# Patient Record
(demographics unavailable — no encounter records)

---

## 2025-07-17 NOTE — IMAGING
[de-identified] :  LEFT KNEE Inspection:  minimal effusion Palpation: medial facet of the patella tenderness  Knee Range of Motion:  0-135 Strength: 5/5 Quadriceps strength, 5/5 Hamstring strength, 4/5 Hip Abductor strength Neurological: light touch is intact throughout Ligament Stability and Special Tests:  McMurrays: neg Lachman: neg Pivot Shift: neg Posterior Drawer: neg Valgus: neg Varus: neg Patella Apprehension: neg Patella Maltracking: neg   RIGHT KNEE Inspection:  minimal effusion Palpation: medial facet of the patella tenderness  Knee Range of Motion:  0-135 Strength: 5/5 Quadriceps strength, 5/5 Hamstring strength, 4/5 Hip Abductor strength Neurological: light touch is intact throughout Ligament Stability and Special Tests:  McMurrays: neg Lachman: neg Pivot Shift: neg Posterior Drawer: neg Valgus: neg Varus: neg Patella Apprehension: neg Patella Maltracking: neg

## 2025-07-17 NOTE — HISTORY OF PRESENT ILLNESS
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 Has tried ice, Tylenol, ibuprofen

## 2025-07-17 NOTE — HISTORY OF PRESENT ILLNESS
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 Has tried ice, Tylenol, ibuprofen

## 2025-07-17 NOTE — ASSESSMENT
[FreeTextEntry1] : Bilateral X-Ray Examination of the KNEE (4 views): there are no fractures, subluxations or dislocations.   - We discussed their diagnosis and treatment options at length including the risks and benefits of both surgical and non-surgical options. Surgical risks include but are not limited to pain, infection, bleeding, vascular injury, numbness, tingling, nerve damage. - Due to the risk of surgery, they will continue conservative treatment with icing, anti-inflammatory medication, and PT - The patient was provided with a prescription to work on hip ER/abductors strengthening along with quad/hamstring stretches and strengthening - The patient was advised to let pain guide the gradual advancement of activities. - naprosyn rx - Patient was given a prescription for an anti-inflammatory medication.  They will take it for the next week and then on an as needed basis, as long as there are no medical contra-indications.  Patient is counseled on possible GI, renal, and cardiovascular side effects. - Follow up as needed in 6 weeks to re-evaluate.

## 2025-07-17 NOTE — IMAGING
[de-identified] :  LEFT KNEE Inspection:  minimal effusion Palpation: medial facet of the patella tenderness  Knee Range of Motion:  0-135 Strength: 5/5 Quadriceps strength, 5/5 Hamstring strength, 4/5 Hip Abductor strength Neurological: light touch is intact throughout Ligament Stability and Special Tests:  McMurrays: neg Lachman: neg Pivot Shift: neg Posterior Drawer: neg Valgus: neg Varus: neg Patella Apprehension: neg Patella Maltracking: neg   RIGHT KNEE Inspection:  minimal effusion Palpation: medial facet of the patella tenderness  Knee Range of Motion:  0-135 Strength: 5/5 Quadriceps strength, 5/5 Hamstring strength, 4/5 Hip Abductor strength Neurological: light touch is intact throughout Ligament Stability and Special Tests:  McMurrays: neg Lachman: neg Pivot Shift: neg Posterior Drawer: neg Valgus: neg Varus: neg Patella Apprehension: neg Patella Maltracking: neg